# Patient Record
Sex: MALE | NOT HISPANIC OR LATINO | Employment: OTHER | URBAN - METROPOLITAN AREA
[De-identification: names, ages, dates, MRNs, and addresses within clinical notes are randomized per-mention and may not be internally consistent; named-entity substitution may affect disease eponyms.]

---

## 2018-04-16 ENCOUNTER — OFFICE VISIT (OUTPATIENT)
Dept: RETAIL CLINIC | Facility: CLINIC | Age: 23
End: 2018-04-16

## 2018-04-16 VITALS
WEIGHT: 270.4 LBS | HEART RATE: 81 BPM | RESPIRATION RATE: 18 BRPM | SYSTOLIC BLOOD PRESSURE: 128 MMHG | BODY MASS INDEX: 37.85 KG/M2 | HEIGHT: 71 IN | TEMPERATURE: 98.2 F | DIASTOLIC BLOOD PRESSURE: 84 MMHG | OXYGEN SATURATION: 97 %

## 2018-04-16 DIAGNOSIS — H60.502 ACUTE OTITIS EXTERNA OF LEFT EAR, UNSPECIFIED TYPE: Primary | ICD-10-CM

## 2018-04-16 PROCEDURE — 99203 OFFICE O/P NEW LOW 30 MIN: CPT | Performed by: NURSE PRACTITIONER

## 2018-04-16 NOTE — PROGRESS NOTES
Subjective   Brian Galvan is a 22 y.o. male.     History of Present Illness   Patient presents with 4 days of left ear pain. He is taking IBU for pain and keeping his ear covered while outside doing work at a [employer] business. He is taking sudafed thinking it could be related to allergies. He uses Q-tips to clean his ears on a regular basis. He denies fever or chills.   The following portions of the patient's history were reviewed and updated as appropriate: allergies, current medications, past family history, past social history, past surgical history and problem list.    Review of Systems   Constitutional: Negative.    HENT: Positive for congestion and ear pain (left ear). Negative for rhinorrhea, sinus pain, sinus pressure, sore throat and voice change.    Eyes: Negative.    Respiratory: Negative.    Cardiovascular: Negative.    Musculoskeletal: Negative.    Allergic/Immunologic: Positive for environmental allergies.   Neurological: Negative.        Objective   Physical Exam   Constitutional: He is oriented to person, place, and time. Vital signs are normal. He appears well-developed and well-nourished.   HENT:   Head: Normocephalic and atraumatic.   Right Ear: Hearing, tympanic membrane, external ear and ear canal normal.   Left Ear: External ear normal. There is drainage and swelling.   Ears:    Nose: Nose normal.   Mouth/Throat: Oropharynx is clear and moist and mucous membranes are normal. Tonsils are 0 on the right. Tonsils are 0 on the left. No tonsillar exudate.   Eyes: Conjunctivae are normal.   Cardiovascular: Normal rate and regular rhythm.    Pulmonary/Chest: Effort normal.   Neurological: He is alert and oriented to person, place, and time.   Skin: Skin is warm and dry.       Assessment/Plan   Brian was seen today for earache.    Diagnoses and all orders for this visit:    Acute otitis externa of left ear, unspecified type  -     neomycin-polymyxin-hydrocortisone (CORTISPORIN) 3.5-11534-3 otic  solution; Administer 3 drops into the left ear 3 (Three) Times a Day.    LUDWIN Azar

## 2022-10-10 ENCOUNTER — OFFICE VISIT (OUTPATIENT)
Dept: ORTHOPEDIC SURGERY | Facility: CLINIC | Age: 27
End: 2022-10-10

## 2022-10-10 VITALS
SYSTOLIC BLOOD PRESSURE: 152 MMHG | BODY MASS INDEX: 41.52 KG/M2 | WEIGHT: 290 LBS | HEIGHT: 70 IN | DIASTOLIC BLOOD PRESSURE: 86 MMHG

## 2022-10-10 DIAGNOSIS — M79.671 PAIN OF MIDFOOT, RIGHT: Primary | ICD-10-CM

## 2022-10-10 DIAGNOSIS — M21.41 PES PLANUS OF BOTH FEET: ICD-10-CM

## 2022-10-10 DIAGNOSIS — M21.42 PES PLANUS OF BOTH FEET: ICD-10-CM

## 2022-10-10 PROCEDURE — 99203 OFFICE O/P NEW LOW 30 MIN: CPT

## 2022-10-10 RX ORDER — IBUPROFEN 600 MG/1
600 TABLET ORAL EVERY 8 HOURS PRN
Qty: 90 TABLET | Refills: 1 | Status: SHIPPED | OUTPATIENT
Start: 2022-10-10

## 2022-10-10 NOTE — PROGRESS NOTES
[facility] Office Visit - Elise Rondon PA-C    Office Visit       Patient Name: Brian Galvan    Chief Complaint:   Chief Complaint   Patient presents with   • Right Ankle - Pain     No known injury       Referring Physician: Saud Barrow MD    History of Present Illness:   Brian Galvan is a 27 y.o. male who presents with right foot pain.  He reports for the last year he has intermittent throbbing pain over the dorsal aspect of his right foot.  He says that it is associated with swelling, popping, and stiffness.  He says that it worsens with standing.  He has tried ibuprofen for relief.  He denies any injury or trauma to the foot.  Denies heel pain.  He says sometimes the left foot also bothers him.    He went to urgent care on 9/26/2022 due to the pain. X-ray films obtained. He was given prednisone which he says relieved the pain.        Subjective     Review of Systems   Constitutional: Negative.  Negative for chills, fatigue and fever.   HENT: Negative.  Negative for congestion and dental problem.    Eyes: Negative.  Negative for blurred vision.   Respiratory: Negative.  Negative for shortness of breath.    Cardiovascular: Negative.  Negative for leg swelling.   Gastrointestinal: Negative.  Negative for abdominal pain.   Endocrine: Negative.  Negative for polyuria.   Genitourinary: Negative.  Negative for difficulty urinating.   Musculoskeletal: Positive for arthralgias.   Skin: Negative.    Allergic/Immunologic: Negative.    Neurological: Negative.    Hematological: Negative.  Negative for adenopathy.   Psychiatric/Behavioral: Negative.  Negative for behavioral problems.        Past Medical History: History reviewed. No pertinent past medical history.    Past Surgical History:   Past Surgical History:   Procedure Laterality Date   • ADENOIDECTOMY     • TONSILLECTOMY         Family History: History reviewed. No pertinent  "family history.    Social History:   Social History     Socioeconomic History   • Marital status:    Tobacco Use   • Smoking status: Never   • Smokeless tobacco: Current     Types: Chew   Vaping Use   • Vaping Use: Never used   Substance and Sexual Activity   • Alcohol use: Yes     Alcohol/week: 4.0 standard drinks     Types: 4 Cans of beer per week     Comment: weekend social use   • Drug use: Never   • Sexual activity: Yes     Partners: Female       Medications:   Current Outpatient Medications:   •  predniSONE (DELTASONE) 20 MG tablet, Take 1 tablet by mouth 2 (Two) Times a Day., Disp: 12 tablet, Rfl: 0  •  Diclofenac Sodium (VOLTAREN) 1 % gel gel, Apply 4 g topically to the appropriate area as directed 3 (Three) Times a Day As Needed (As needed) for up to 30 days., Disp: 360 g, Rfl: 3  •  ibuprofen (ADVIL,MOTRIN) 600 MG tablet, Take 1 tablet by mouth Every 8 (Eight) Hours As Needed for Mild Pain., Disp: 90 tablet, Rfl: 1    Allergies: No Known Allergies    I have reviewed and updated the following portions of the patient's history and review of systems: allergies, current medications, past family history, past medical history, past social history, past surgical history and problem list.    Objective      Vital Signs:   Vitals:    10/10/22 1410   BP: 152/86   Weight: 132 kg (290 lb)   Height: 177.8 cm (70\")       Ortho Exam:  Peripheral Vascular:  Lower Extremity:  Inspection:  Right--rapid capillary refill  Palpation:  Dorsalis pedis pulse:   Right--normal    Neurologic  Sensory:    Light Touch:     Right foot:  Dorsal intact and plantar intact       Overall Assessment of Muscle Strength and Tone:  Lower Extremities:     Right:  Tibialis anterior--5/5    Gastroc soleus--5/5    EHL--5/5    FHL--5/5      Musculoskeletal  Lower Extremity  Ankle/Foot:  Inspection and Palpation:     Right:  Tenderness: No    Swelling: No    Effusion:  None    Crepitus:  None       ROM:   Right:  " Plantarflexion--50    Dorsiflexion--20    Inversion--10    Eversion--10    Tinel's sign: Negative        Results Review:   XR Ankle 3+ View Right  Narrative: XR ANKLE 3+ VW RIGHT-     Date of Exam: 9/26/2022 7:18 PM     Indication: intermittent ankle pain and swelling, no injury;  M25.571-Pain in right ankle and joints of right foot.     Comparison Exams: None available.     Technique: 3 radiographs of the right ankle     FINDINGS:  No acute fracture is identified. The mortise is congruent. There is a  focal defect along the medial talar dome. The soft tissues are  unremarkable.     Impression: 1.  No acute osseous process identified.  2.  Focal defect along medial talar dome, raising possibility of OCD.  Consider further evaluation with MRI.     This report was finalized on 9/26/2022 7:35 PM by Miguel Coyle MD.            Assessment / Plan      Assessment/Plan:   Diagnoses and all orders for this visit:    1. Pain of midfoot, right (Primary)  -     Diclofenac Sodium (VOLTAREN) 1 % gel gel; Apply 4 g topically to the appropriate area as directed 3 (Three) Times a Day As Needed (As needed) for up to 30 days.  Dispense: 360 g; Refill: 3  -     ibuprofen (ADVIL,MOTRIN) 600 MG tablet; Take 1 tablet by mouth Every 8 (Eight) Hours As Needed for Mild Pain.  Dispense: 90 tablet; Refill: 1    2. Pes planus of both feet      -X-rays from urgent care show possible osteochondral defect. No acute osseous process.  -May continue with oral anti-inflammatories. Encouraged to try voltaren gel topical to help with symptoms/  -Given script for custom orthotics. To wear as much as possible for arch support.  -Spoke with Dr. Cross about x-ray films and symptoms. Follow-up with Dr. Cross in 4 weeks after trying orthotics.        Follow Up:   Return in about 4 weeks (around 11/7/2022) for F/U with Tay.   Please obtain standing 3+ view ankle films at next visit.           Elise Rondon PA-C  [facility]